# Patient Record
Sex: FEMALE | Race: WHITE | NOT HISPANIC OR LATINO | URBAN - METROPOLITAN AREA
[De-identification: names, ages, dates, MRNs, and addresses within clinical notes are randomized per-mention and may not be internally consistent; named-entity substitution may affect disease eponyms.]

---

## 2017-03-08 ENCOUNTER — EMERGENCY (EMERGENCY)
Facility: HOSPITAL | Age: 62
LOS: 1 days | Discharge: HOME | End: 2017-03-08
Admitting: EMERGENCY MEDICINE
Payer: COMMERCIAL

## 2017-03-08 VITALS
DIASTOLIC BLOOD PRESSURE: 77 MMHG | RESPIRATION RATE: 20 BRPM | OXYGEN SATURATION: 97 % | TEMPERATURE: 99 F | SYSTOLIC BLOOD PRESSURE: 151 MMHG | HEART RATE: 72 BPM

## 2017-03-08 VITALS
OXYGEN SATURATION: 100 % | DIASTOLIC BLOOD PRESSURE: 61 MMHG | HEART RATE: 74 BPM | SYSTOLIC BLOOD PRESSURE: 148 MMHG | RESPIRATION RATE: 16 BRPM

## 2017-03-08 PROCEDURE — 99283 EMERGENCY DEPT VISIT LOW MDM: CPT

## 2017-03-08 RX ORDER — LIDOCAINE 4 G/100G
1 CREAM TOPICAL ONCE
Qty: 0 | Refills: 0 | Status: COMPLETED | OUTPATIENT
Start: 2017-03-08 | End: 2017-03-08

## 2017-03-08 RX ORDER — ONDANSETRON 8 MG/1
4 TABLET, FILM COATED ORAL ONCE
Qty: 0 | Refills: 0 | Status: COMPLETED | OUTPATIENT
Start: 2017-03-08 | End: 2017-03-08

## 2017-03-08 RX ORDER — KETOROLAC TROMETHAMINE 30 MG/ML
60 SYRINGE (ML) INJECTION ONCE
Qty: 0 | Refills: 0 | Status: DISCONTINUED | OUTPATIENT
Start: 2017-03-08 | End: 2017-03-08

## 2017-03-08 RX ORDER — ONDANSETRON 8 MG/1
4 TABLET, FILM COATED ORAL ONCE
Qty: 0 | Refills: 0 | Status: DISCONTINUED | OUTPATIENT
Start: 2017-03-08 | End: 2017-03-08

## 2017-03-08 RX ADMIN — Medication 60 MILLIGRAM(S): at 22:47

## 2017-03-08 RX ADMIN — ONDANSETRON 4 MILLIGRAM(S): 8 TABLET, FILM COATED ORAL at 23:42

## 2017-03-08 RX ADMIN — Medication 60 MILLIGRAM(S): at 23:00

## 2017-03-08 RX ADMIN — LIDOCAINE 1 PATCH: 4 CREAM TOPICAL at 22:47

## 2017-03-08 NOTE — ED PROVIDER NOTE - CARE PLAN
Principal Discharge DX:	Neck muscle spasm  Instructions for follow-up, activity and diet:	Rest, advance activity as tolerated  Apply heat to affected area 15 min on/15 min off  Take Valium 5mg every 8 hours as needed - DO NOT DRIVE ON THIS MEDICATION  Take Percocet 1 tab every 6 hours as needed for pain- DO NOT DRIVE ON THIS MEDICATION  Continue all previously prescribed medications as directed  Follow up with your PMD within 2-3 days  Return to the ER for worsening  Secondary Diagnosis:	Musculoskeletal pain

## 2017-03-08 NOTE — ED PROVIDER NOTE - PLAN OF CARE
Rest, advance activity as tolerated  Apply heat to affected area 15 min on/15 min off  Take Valium 5mg every 8 hours as needed - DO NOT DRIVE ON THIS MEDICATION  Take Percocet 1 tab every 6 hours as needed for pain- DO NOT DRIVE ON THIS MEDICATION  Continue all previously prescribed medications as directed  Follow up with your PMD within 2-3 days  Return to the ER for worsening

## 2017-03-08 NOTE — ED PROVIDER NOTE - MEDICAL DECISION MAKING DETAILS
61 yo F PMH cervical herniated disc, arthritis, HLD, HTN presents c/o severe neck pain x 2 days  Muscle spasms, muscle strain  Pain control, muscle relaxer, heating pad, nausea meds

## 2017-03-08 NOTE — ED PROVIDER NOTE - OBJECTIVE STATEMENT
61 yo F PMH cervical herniated disc, arthritis, HTN, HLD presents c/o 2 days severe neck pain. Pt reports making a sudden movement Sunday night which she believes triggered the onset of the pain the following morning. Pt describes the pain as 10/10 currently, pain begins behind the neck B/L and radiates up toward the head B/L. Pt has had pain like this previously, and usually responds to heating pad and anti-inflammatories. Pt took Neurontin yesterday with no relief, and took 400mg motrin this morning which did provide some relief. She reports difficulty falling asleep, as any movement exacerbates the pain, especially lying flat. Pt also states that she experienced this same neck pain when trying to eat dinner which prompted her to come to the ED tonight; she was able to tolerate food and liquids PO but swallowing exacerbated her symptoms. Pt denies any visual disturbances, tinnitus, motor weakness, paresthesias, dizziness, chest pain, SOB, incontinence, vomitting, fevers or photophobia.

## 2017-03-08 NOTE — ED PROVIDER NOTE - PROGRESS NOTE DETAILS
PA Baseil: Pain improved with the addition of percocet- will dc home with pain medications and pmd/ortho f/u. Pt stable for dc home.

## 2017-03-09 RX ORDER — LIDOCAINE 4 G/100G
1 CREAM TOPICAL
Qty: 5 | Refills: 0 | OUTPATIENT
Start: 2017-03-09 | End: 2017-03-14

## 2017-03-09 RX ORDER — OXYCODONE HYDROCHLORIDE 5 MG/1
1 TABLET ORAL
Qty: 15 | Refills: 0 | OUTPATIENT
Start: 2017-03-09 | End: 2017-03-12

## 2017-03-09 RX ORDER — DIAZEPAM 5 MG
1 TABLET ORAL
Qty: 9 | Refills: 0 | OUTPATIENT
Start: 2017-03-09 | End: 2017-03-12

## 2017-04-04 ENCOUNTER — RESULT REVIEW (OUTPATIENT)
Age: 62
End: 2017-04-04

## 2017-04-10 ENCOUNTER — OUTPATIENT (OUTPATIENT)
Dept: OUTPATIENT SERVICES | Facility: HOSPITAL | Age: 62
LOS: 1 days | End: 2017-04-10
Payer: COMMERCIAL

## 2017-04-10 ENCOUNTER — APPOINTMENT (OUTPATIENT)
Dept: MAMMOGRAPHY | Facility: IMAGING CENTER | Age: 62
End: 2017-04-10

## 2017-04-10 ENCOUNTER — APPOINTMENT (OUTPATIENT)
Dept: ULTRASOUND IMAGING | Facility: IMAGING CENTER | Age: 62
End: 2017-04-10

## 2017-04-10 DIAGNOSIS — Z00.00 ENCOUNTER FOR GENERAL ADULT MEDICAL EXAMINATION WITHOUT ABNORMAL FINDINGS: ICD-10-CM

## 2017-04-10 PROCEDURE — 77067 SCR MAMMO BI INCL CAD: CPT

## 2017-04-10 PROCEDURE — 76641 ULTRASOUND BREAST COMPLETE: CPT

## 2017-04-10 PROCEDURE — 77063 BREAST TOMOSYNTHESIS BI: CPT

## 2017-04-18 DIAGNOSIS — R92.2 INCONCLUSIVE MAMMOGRAM: ICD-10-CM

## 2017-04-18 DIAGNOSIS — Z12.31 ENCOUNTER FOR SCREENING MAMMOGRAM FOR MALIGNANT NEOPLASM OF BREAST: ICD-10-CM

## 2019-11-12 NOTE — ED PROVIDER NOTE - DETAILS:
Ears: no ear pain and no hearing problems.Nose: no nasal congestion and no nasal drainage.Mouth/Throat: no dysphagia, no hoarseness and no throat pain.Neck: no lumps, no pain, no stiffness and no swollen glands. Per institutional requirements, I have reviewed the chart, however was not consulted specifically or made aware of this patient by the above midlevel provider and did not personally evaluate, interact with, or disposition this patient on the day of their visit.